# Patient Record
Sex: MALE | Race: WHITE | NOT HISPANIC OR LATINO
[De-identification: names, ages, dates, MRNs, and addresses within clinical notes are randomized per-mention and may not be internally consistent; named-entity substitution may affect disease eponyms.]

---
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
## 2020-01-10 ENCOUNTER — CHART COPY (OUTPATIENT)
Age: 20
End: 2020-01-10

## 2020-02-03 ENCOUNTER — FORM ENCOUNTER (OUTPATIENT)
Age: 20
End: 2020-02-03

## 2020-02-04 ENCOUNTER — APPOINTMENT (OUTPATIENT)
Dept: MRI IMAGING | Facility: CLINIC | Age: 20
End: 2020-02-04
Payer: COMMERCIAL

## 2020-02-04 ENCOUNTER — OUTPATIENT (OUTPATIENT)
Dept: OUTPATIENT SERVICES | Facility: HOSPITAL | Age: 20
LOS: 1 days | End: 2020-02-04
Payer: COMMERCIAL

## 2020-02-04 DIAGNOSIS — Z00.8 ENCOUNTER FOR OTHER GENERAL EXAMINATION: ICD-10-CM

## 2020-02-04 PROCEDURE — 73720 MRI LWR EXTREMITY W/O&W/DYE: CPT | Mod: 26,LT

## 2020-02-04 PROCEDURE — 73720 MRI LWR EXTREMITY W/O&W/DYE: CPT

## 2020-02-04 PROCEDURE — A9585: CPT

## 2020-02-04 PROCEDURE — C8914: CPT

## 2020-03-12 ENCOUNTER — APPOINTMENT (OUTPATIENT)
Dept: INTERVENTIONAL RADIOLOGY/VASCULAR | Facility: CLINIC | Age: 20
End: 2020-03-12
Payer: COMMERCIAL

## 2020-05-21 ENCOUNTER — APPOINTMENT (OUTPATIENT)
Dept: INTERVENTIONAL RADIOLOGY/VASCULAR | Facility: CLINIC | Age: 20
End: 2020-05-21
Payer: COMMERCIAL

## 2020-05-21 VITALS — HEIGHT: 66 IN | BODY MASS INDEX: 27.97 KG/M2 | WEIGHT: 174 LBS

## 2020-05-21 DIAGNOSIS — M79.605 PAIN IN LEFT LEG: ICD-10-CM

## 2020-05-21 PROCEDURE — 99215 OFFICE O/P EST HI 40 MIN: CPT | Mod: 95

## 2020-05-21 NOTE — CONSULT LETTER
[Dear  ___] : Dear  [unfilled], [Consult Letter:] : I had the pleasure of evaluating your patient, [unfilled]. [Please see my note below.] : Please see my note below. [Consult Closing:] : Thank you very much for allowing me to participate in the care of this patient.  If you have any questions, please do not hesitate to contact me. [Sincerely,] : Sincerely, [FreeTextEntry2] : Christiano Storey  [FreeTextEntry3] : Ac Josue MD, FSIR, FACR\par Interventional Radiologist\par  \par Executive \par Department of Radiology- [facility] \par  \par Associate Professor of Radiology\par [employer] [facility] \par

## 2020-05-21 NOTE — HISTORY OF PRESENT ILLNESS
[Home] : at home, [unfilled] , at the time of the visit. [Medical Office: ([facility])___] : at the medical office located in  [Mother] : mother [Verbal consent obtained from patient] : the patient, [unfilled] [FreeTextEntry1] : pt denies having any COVID-19 related symptoms or being near some that tested positive. Denies having any fever, chills, flu like symptoms or rash. Denies any history of out state travel in the last 2 weeks. \par Denies being tested for COVID-19 \par \par 20 years old male with hx of LLE pain which he states has been going on for the 2 years. Pain is mostly in the left calf. Pain gets worse with walking, prolonged standing. He denies trauma/injury to the site. He did state that he had a fracture at the age of [age] in the LLE. He rates his pain as  2/10 he is not taking any pain medications.\par He denies any numbness, tingling, in the LLE. \par \par He was seen by orthopedic and had an MRI done of the LLE. MRI demonstrated, “focal rounded area of hyperintense T2 weighted signal in the proximal medial gastrocnemius muscle measuring 1.5 cm potentially representing a hemangioma or focal dilated blood vessels, which appear non aggressive”.\par \par Interval history\par pt is s/p LLE sclerotherapy on 1/14/19 . pt did not follow up post procedure in the office but called the office in January and reported that his symptoms are back and he is experiencing LLE pain.pt was advised to have MRI/MRA done to further evaluated the vascular malformation. pt have the scan done on 2/24/20 that revealed 1.5 x 1.4 x 1.4 cm vascular lesion within the proximal medial gastrocnemius \par muscle as above. \par \par Due to COVID-19 crisis pt was not seen in the office post MRI/MRA scan.\par \par pt continues to have pain rates the pain as 5 /10 and reports taking and takes Advil \par reports the pain gets worst with activity and ambulation \par Mom reports pt is limping when he walks. \par \par Denies any recent SOB, CP, fever, chills, n/v/d. \par \par \par \par Patient is referred for consultation to discuss treatment plan for LLE vascular malformation. \par \par Denies any recent SOB, CP, fever, chills, n/v/d. \par

## 2020-05-21 NOTE — ASSESSMENT
[FreeTextEntry1] : Consult performed via [facility] with patient and mother.\par Reviewed recent MRI demonstrating vascular lesion within the gastrocnemius muscle in the posterior-lateral proximal lower leg. Again, the Legion is consistent with a venous malformation on MRI and clinically.\par I reviewed the prior procedure where we had some difficulty targeting the lesion and eventually utilize cone beam CT for lesion puncture. We did review the possible surgical alternative although at this point, I feel it would be reasonable to see if we could repeat the procedure and achieve a more durable results. If not, I would consider a surgical approach. We did discuss that these lesions are often not cured and that the decision-maker in terms of proceeding to surgery after attempt at sclerotherapy would be dependent upon the length of the duration of symptom relief.\par

## 2020-05-21 NOTE — REVIEW OF SYSTEMS
[Fever] : no fever [Chills] : no chills [Loss Of Hearing] : no hearing loss [Eyesight Problems] : no eyesight problems [Chest Pain] : no chest pain [Palpitations] : no palpitations [Shortness Of Breath] : no shortness of breath [Easy Bleeding] : no tendency for easy bleeding [Easy Bruising] : no tendency for easy bruising

## 2020-05-21 NOTE — DATA REVIEWED
[FreeTextEntry1] : MRI/MRA images reviewed and results discussed in great detail with the patient and mother.Small vascular lesion in the medial portion of the proximal gastrocnemius.

## 2020-06-20 DIAGNOSIS — Z01.818 ENCOUNTER FOR OTHER PREPROCEDURAL EXAMINATION: ICD-10-CM

## 2020-06-20 LAB
ANION GAP SERPL CALC-SCNC: 13 MMOL/L
APTT BLD: 33.6 SEC
BASOPHILS # BLD AUTO: 0.05 K/UL
BASOPHILS NFR BLD AUTO: 0.8 %
BUN SERPL-MCNC: 16 MG/DL
CALCIUM SERPL-MCNC: 10.2 MG/DL
CHLORIDE SERPL-SCNC: 101 MMOL/L
CO2 SERPL-SCNC: 27 MMOL/L
CREAT SERPL-MCNC: 0.82 MG/DL
EOSINOPHIL # BLD AUTO: 0.24 K/UL
EOSINOPHIL NFR BLD AUTO: 3.9 %
GLUCOSE SERPL-MCNC: 105 MG/DL
HCT VFR BLD CALC: 47.2 %
HGB BLD-MCNC: 15.1 G/DL
IMM GRANULOCYTES NFR BLD AUTO: 0.2 %
INR PPP: 1.03 RATIO
LYMPHOCYTES # BLD AUTO: 2 K/UL
LYMPHOCYTES NFR BLD AUTO: 32.7 %
MAN DIFF?: NORMAL
MCHC RBC-ENTMCNC: 28.4 PG
MCHC RBC-ENTMCNC: 32 GM/DL
MCV RBC AUTO: 88.9 FL
MONOCYTES # BLD AUTO: 0.59 K/UL
MONOCYTES NFR BLD AUTO: 9.6 %
NEUTROPHILS # BLD AUTO: 3.23 K/UL
NEUTROPHILS NFR BLD AUTO: 52.8 %
PLATELET # BLD AUTO: 303 K/UL
POTASSIUM SERPL-SCNC: 4.2 MMOL/L
PT BLD: 11.8 SEC
RBC # BLD: 5.31 M/UL
RBC # FLD: 12.2 %
SODIUM SERPL-SCNC: 141 MMOL/L
WBC # FLD AUTO: 6.12 K/UL

## 2020-06-22 ENCOUNTER — APPOINTMENT (OUTPATIENT)
Dept: DISASTER EMERGENCY | Facility: CLINIC | Age: 20
End: 2020-06-22

## 2020-06-23 LAB — SARS-COV-2 N GENE NPH QL NAA+PROBE: NOT DETECTED

## 2020-06-24 ENCOUNTER — OUTPATIENT (OUTPATIENT)
Dept: OUTPATIENT SERVICES | Age: 20
LOS: 1 days | End: 2020-06-24
Payer: COMMERCIAL

## 2020-06-24 DIAGNOSIS — Q27.8 OTHER SPECIFIED CONGENITAL MALFORMATIONS OF PERIPHERAL VASCULAR SYSTEM: ICD-10-CM

## 2020-06-24 PROCEDURE — 37241 VASC EMBOLIZE/OCCLUDE VENOUS: CPT

## 2020-07-06 DIAGNOSIS — Q27.9 CONGENITAL MALFORMATION OF PERIPHERAL VASCULAR SYSTEM, UNSPECIFIED: ICD-10-CM

## 2021-03-05 ENCOUNTER — APPOINTMENT (OUTPATIENT)
Dept: MRI IMAGING | Facility: CLINIC | Age: 21
End: 2021-03-05
Payer: COMMERCIAL

## 2021-03-05 ENCOUNTER — RESULT REVIEW (OUTPATIENT)
Age: 21
End: 2021-03-05

## 2021-03-05 ENCOUNTER — OUTPATIENT (OUTPATIENT)
Dept: OUTPATIENT SERVICES | Facility: HOSPITAL | Age: 21
LOS: 1 days | End: 2021-03-05
Payer: COMMERCIAL

## 2021-03-05 DIAGNOSIS — Z00.8 ENCOUNTER FOR OTHER GENERAL EXAMINATION: ICD-10-CM

## 2021-03-05 PROCEDURE — 73720 MRI LWR EXTREMITY W/O&W/DYE: CPT | Mod: 26,LT

## 2021-03-05 PROCEDURE — A9585: CPT

## 2021-03-05 PROCEDURE — C8914: CPT

## 2021-03-05 PROCEDURE — 73725 MR ANG LWR EXT W OR W/O DYE: CPT | Mod: 26,59,LT

## 2021-03-05 PROCEDURE — 73720 MRI LWR EXTREMITY W/O&W/DYE: CPT

## 2021-04-08 ENCOUNTER — APPOINTMENT (OUTPATIENT)
Dept: INTERVENTIONAL RADIOLOGY/VASCULAR | Facility: CLINIC | Age: 21
End: 2021-04-08
Payer: COMMERCIAL

## 2021-04-08 VITALS
HEART RATE: 98 BPM | SYSTOLIC BLOOD PRESSURE: 104 MMHG | RESPIRATION RATE: 18 BRPM | DIASTOLIC BLOOD PRESSURE: 69 MMHG | WEIGHT: 180 LBS | OXYGEN SATURATION: 96 % | BODY MASS INDEX: 28.93 KG/M2 | HEIGHT: 66 IN

## 2021-04-08 PROCEDURE — XXXXX: CPT

## 2021-04-08 RX ORDER — ASCORBIC ACID 500 MG
TABLET ORAL
Refills: 0 | Status: COMPLETED | COMMUNITY
End: 2021-04-08

## 2021-04-08 RX ORDER — CHROMIUM 200 MCG
TABLET ORAL
Refills: 0 | Status: COMPLETED | COMMUNITY
End: 2021-04-08

## 2021-05-25 ENCOUNTER — APPOINTMENT (OUTPATIENT)
Dept: PLASTIC SURGERY | Facility: CLINIC | Age: 21
End: 2021-05-25
Payer: COMMERCIAL

## 2021-05-25 VITALS — HEIGHT: 66 IN | WEIGHT: 184 LBS | BODY MASS INDEX: 29.57 KG/M2

## 2021-05-25 PROCEDURE — 99203 OFFICE O/P NEW LOW 30 MIN: CPT

## 2021-05-26 NOTE — ASSESSMENT
[FreeTextEntry1] : Pt was seen and examined together by KIAN Gamboa and Dr. Fareed Bell. Assessment and plan formulated and discussed at time of visit.\par

## 2021-08-11 ENCOUNTER — OUTPATIENT (OUTPATIENT)
Dept: OUTPATIENT SERVICES | Facility: HOSPITAL | Age: 21
LOS: 1 days | End: 2021-08-11

## 2021-08-11 VITALS
WEIGHT: 190.04 LBS | HEIGHT: 66.5 IN | HEART RATE: 97 BPM | TEMPERATURE: 98 F | SYSTOLIC BLOOD PRESSURE: 131 MMHG | OXYGEN SATURATION: 98 % | DIASTOLIC BLOOD PRESSURE: 83 MMHG | RESPIRATION RATE: 16 BRPM

## 2021-08-11 DIAGNOSIS — L60.0 INGROWING NAIL: ICD-10-CM

## 2021-08-11 DIAGNOSIS — Q27.8 OTHER SPECIFIED CONGENITAL MALFORMATIONS OF PERIPHERAL VASCULAR SYSTEM: ICD-10-CM

## 2021-08-11 DIAGNOSIS — Z98.890 OTHER SPECIFIED POSTPROCEDURAL STATES: Chronic | ICD-10-CM

## 2021-08-11 DIAGNOSIS — L60.0 INGROWING NAIL: Chronic | ICD-10-CM

## 2021-08-11 LAB
ANION GAP SERPL CALC-SCNC: 15 MMOL/L — HIGH (ref 7–14)
BUN SERPL-MCNC: 15 MG/DL — SIGNIFICANT CHANGE UP (ref 7–23)
CALCIUM SERPL-MCNC: 10.2 MG/DL — SIGNIFICANT CHANGE UP (ref 8.4–10.5)
CHLORIDE SERPL-SCNC: 103 MMOL/L — SIGNIFICANT CHANGE UP (ref 98–107)
CO2 SERPL-SCNC: 24 MMOL/L — SIGNIFICANT CHANGE UP (ref 22–31)
CREAT SERPL-MCNC: 0.89 MG/DL — SIGNIFICANT CHANGE UP (ref 0.5–1.3)
GLUCOSE SERPL-MCNC: 95 MG/DL — SIGNIFICANT CHANGE UP (ref 70–99)
HCT VFR BLD CALC: 45.1 % — SIGNIFICANT CHANGE UP (ref 39–50)
HGB BLD-MCNC: 15 G/DL — SIGNIFICANT CHANGE UP (ref 13–17)
MCHC RBC-ENTMCNC: 28.8 PG — SIGNIFICANT CHANGE UP (ref 27–34)
MCHC RBC-ENTMCNC: 33.3 GM/DL — SIGNIFICANT CHANGE UP (ref 32–36)
MCV RBC AUTO: 86.7 FL — SIGNIFICANT CHANGE UP (ref 80–100)
NRBC # BLD: 0 /100 WBCS — SIGNIFICANT CHANGE UP
NRBC # FLD: 0 K/UL — SIGNIFICANT CHANGE UP
PLATELET # BLD AUTO: 322 K/UL — SIGNIFICANT CHANGE UP (ref 150–400)
POTASSIUM SERPL-MCNC: 3.7 MMOL/L — SIGNIFICANT CHANGE UP (ref 3.5–5.3)
POTASSIUM SERPL-SCNC: 3.7 MMOL/L — SIGNIFICANT CHANGE UP (ref 3.5–5.3)
RBC # BLD: 5.2 M/UL — SIGNIFICANT CHANGE UP (ref 4.2–5.8)
RBC # FLD: 12 % — SIGNIFICANT CHANGE UP (ref 10.3–14.5)
SODIUM SERPL-SCNC: 142 MMOL/L — SIGNIFICANT CHANGE UP (ref 135–145)
WBC # BLD: 7.53 K/UL — SIGNIFICANT CHANGE UP (ref 3.8–10.5)
WBC # FLD AUTO: 7.53 K/UL — SIGNIFICANT CHANGE UP (ref 3.8–10.5)

## 2021-08-11 NOTE — H&P PST ADULT - NSICDXPASTMEDICALHX_GEN_ALL_CORE_FT
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

## 2021-08-11 NOTE — H&P PST ADULT - NSICDXPASTSURGICALHX_GEN_ALL_CORE_FT
PAST SURGICAL HISTORY:  Ingrown toenail s/p surgery one week ago- still on antibiotics,  'healing well'    S/P sclerotherapy of varicose veins LLE  2019, 2020

## 2021-08-11 NOTE — H&P PST ADULT - NSICDXFAMILYHX_GEN_ALL_CORE_FT
FAMILY HISTORY:  Father  Still living? Yes, Estimated age: Age Unknown  FH: brain tumor, Age at diagnosis: Age Unknown    Sibling  Still living? Yes, Estimated age: 11-20  Family history of hemophilia A, Age at diagnosis: Age Unknown

## 2021-08-11 NOTE — H&P PST ADULT - WEIGHT IN KG
Patient: aCrlos Zazueta    Procedure Summary     Date:  11/15/18 Room / Location:  [facility] OR 08 / [facility] OR    Anesthesia Start:  0845 Anesthesia Stop:  1133    Procedures:       FIRST METATARSALPHALANGEAL JOINT  ARTHRRODOSIS (popliteal block) (Right )      AND SECOND PLANTAR PLATE REPIAR AND ALL OTHER INDICATED PROCEDURES      (C-ARM) (Right Foot) Diagnosis:       Hallux rigidus of right foot      Injury of plantar plate of right foot, subsequent encounter      (Hallux rigidus of right foot [M20.21])      (Injury of plantar plate of right foot, subsequent encounter [S99.921D])    Surgeon:  Anthony Moore DPM Provider:  David Herrera MD    Anesthesia Type:  general, regional ASA Status:  3          Anesthesia Type: general, regional  Last vitals  BP   128/68 (11/15/18 1123)   Temp   99.3 °F (37.4 °C) (11/15/18 1123)   Pulse   78 (11/15/18 1123)   Resp   18 (11/15/18 1123)     SpO2   96 % (11/15/18 1123)     Post Anesthesia Care and Evaluation    Patient location during evaluation: PACU  Patient participation: complete - patient participated  Level of consciousness: awake and alert  Pain score: 0  Pain management: adequate  Airway patency: patent  Anesthetic complications: No anesthetic complications  PONV Status: none  Cardiovascular status: acceptable  Respiratory status: acceptable  Hydration status: acceptable      
86.2

## 2021-08-11 NOTE — H&P PST ADULT - NSANTHOSAYNRD_GEN_A_CORE
No. JILLIAN screening performed.  STOP BANG Legend: 0-2 = LOW Risk; 3-4 = INTERMEDIATE Risk; 5-8 = HIGH Risk

## 2021-08-11 NOTE — H&P PST ADULT - HEALTH CARE MAINTENANCE
covid vaccine : pfizer February and March 2021  Denies history of covid infection, or exposure to known +covid person   Traveled to [facility] returned July 30, 2021 covid vaccine : pfizer February and March 2021  Denies history of covid infection, or exposure to known +covid person   Traveled to [facility] returned July 30, 2021, covid test negative per pt

## 2021-08-11 NOTE — H&P PST ADULT - HISTORY OF PRESENT ILLNESS
21 yr old male presents with preop dx other specified congenital malformation peripheral vascular system, scheduled for excision of posterior leg mass, left complex closure, patient reports chronic left posterior knee pain, MRI noted venous malformation s/p  sclerotherapy with mild improvement of knee pain, now for surgery, patient reports pain sometimes is subtle while some days pain is more intense and limiting activities , denies numbness or tingling   of LLE  21 yr old male presents with preop dx other specified congenital malformation peripheral vascular system, scheduled for excision of posterior leg mass, left complex closure, patient reports chronic left posterior knee pain, MRI noted venous malformation s/p  sclerotherapy with mild improvement of knee pain, now for surgery, patient reports pain sometimes is subtle while some days pain is more intense and limiting activities , denies numbness or tingling of LLE

## 2021-08-15 ENCOUNTER — APPOINTMENT (OUTPATIENT)
Dept: DISASTER EMERGENCY | Facility: CLINIC | Age: 21
End: 2021-08-15

## 2021-08-16 ENCOUNTER — APPOINTMENT (OUTPATIENT)
Dept: DISASTER EMERGENCY | Facility: CLINIC | Age: 21
End: 2021-08-16

## 2021-08-16 DIAGNOSIS — Z01.818 ENCOUNTER FOR OTHER PREPROCEDURAL EXAMINATION: ICD-10-CM

## 2021-08-17 ENCOUNTER — TRANSCRIPTION ENCOUNTER (OUTPATIENT)
Age: 21
End: 2021-08-17

## 2021-08-17 VITALS
WEIGHT: 190.04 LBS | TEMPERATURE: 98 F | RESPIRATION RATE: 16 BRPM | HEART RATE: 98 BPM | SYSTOLIC BLOOD PRESSURE: 123 MMHG | OXYGEN SATURATION: 98 % | HEIGHT: 66.5 IN | DIASTOLIC BLOOD PRESSURE: 66 MMHG

## 2021-08-18 ENCOUNTER — APPOINTMENT (OUTPATIENT)
Dept: PLASTIC SURGERY | Facility: HOSPITAL | Age: 21
End: 2021-08-18
Payer: COMMERCIAL

## 2021-08-18 ENCOUNTER — RESULT REVIEW (OUTPATIENT)
Age: 21
End: 2021-08-18

## 2021-08-18 ENCOUNTER — OUTPATIENT (OUTPATIENT)
Dept: OUTPATIENT SERVICES | Facility: HOSPITAL | Age: 21
LOS: 1 days | Discharge: HOME | End: 2021-08-18
Payer: COMMERCIAL

## 2021-08-18 VITALS
DIASTOLIC BLOOD PRESSURE: 66 MMHG | SYSTOLIC BLOOD PRESSURE: 107 MMHG | TEMPERATURE: 98 F | RESPIRATION RATE: 16 BRPM | HEART RATE: 74 BPM | OXYGEN SATURATION: 98 %

## 2021-08-18 DIAGNOSIS — L60.0 INGROWING NAIL: Chronic | ICD-10-CM

## 2021-08-18 DIAGNOSIS — Z98.890 OTHER SPECIFIED POSTPROCEDURAL STATES: Chronic | ICD-10-CM

## 2021-08-18 DIAGNOSIS — Q27.8 OTHER SPECIFIED CONGENITAL MALFORMATIONS OF PERIPHERAL VASCULAR SYSTEM: ICD-10-CM

## 2021-08-18 PROCEDURE — 27615 RESECT LEG/ANKLE TUM < 5 CM: CPT | Mod: LT

## 2021-08-18 PROCEDURE — 88305 TISSUE EXAM BY PATHOLOGIST: CPT | Mod: 26

## 2021-08-18 PROCEDURE — 13121 CMPLX RPR S/A/L 2.6-7.5 CM: CPT | Mod: 59

## 2021-08-18 RX ORDER — IBUPROFEN 200 MG
1 TABLET ORAL
Qty: 0 | Refills: 0 | DISCHARGE

## 2021-08-18 RX ORDER — CEPHALEXIN 500 MG
1 CAPSULE ORAL
Qty: 0 | Refills: 0 | DISCHARGE

## 2021-08-18 RX ORDER — OXYCODONE HYDROCHLORIDE 5 MG/1
1 TABLET ORAL
Qty: 10 | Refills: 0
Start: 2021-08-18

## 2021-08-18 NOTE — BRIEF OPERATIVE NOTE - NSICDXBRIEFPREOP_GEN_ALL_CORE_FT
PRE-OP DIAGNOSIS:  Other congenital anomaly of peripheral vascular system 18-Aug-2021 17:11:56  Margaret Valerio

## 2021-08-18 NOTE — ASU DISCHARGE PLAN (ADULT/PEDIATRIC) - CARE PROVIDER_API CALL
Fareed Bell)  Plastic Surgery  [address], [place] [address]  Phone: [phone]  Fax: [phone]  Follow Up Time: 1 week

## 2021-08-18 NOTE — BRIEF OPERATIVE NOTE - NSICDXBRIEFPOSTOP_GEN_ALL_CORE_FT
POST-OP DIAGNOSIS:  Other congenital anomaly of peripheral vascular system 18-Aug-2021 17:12:00  Margaret Valerio

## 2021-08-18 NOTE — ASU DISCHARGE PLAN (ADULT/PEDIATRIC) - ASU DC SPECIAL INSTRUCTIONSFT
Please follow all pre-operative instructions by Dr Bell.      Please keep the surgical dressing in place for 48 hours.  After 48 hours you may remove the ACE wrap and abdominal pad.  Under the abdominal pad you will notice a special type of band aid called steri strips. Please do not remove these, they will either be removed in the office or fall off on their own.  Please note that it is normal to have some oozing from the surgical site on to the dressing.  After 48 hours you may shower.  When showering please do not scrub or rub directly over the incisions and pat to dry.      For pain control please take Tylenol  and ibuprofen every 6 hours throughout the day.  Additionally you have been prescribed a narcotic medication which you can take for severe pain. Please follow all pre-operative instructions by Dr Bell.      Please keep the surgical dressing in place for 48 hours.  After 48 hours you may remove the ACE wrap and abdominal pad.  Under the abdominal pad you will notice a special type of band aid called steri strips. Please do not remove these, they will either be removed in the office or fall off on their own.  Please note that it is normal to have some oozing from the surgical site on to the dressing.  After 48 hours you may shower.  When showering please do not scrub or rub directly over the incisions and pat to dry.  After the shower you may leave the surgical dressing off.  If you want you may cover the incisions with guaze and the ACE wrap for comfort.     For pain control please take Tylenol  and ibuprofen every 6 hours throughout the day.  Additionally you have been prescribed a narcotic medication which you can take for severe pain.

## 2021-08-20 PROBLEM — F84.0 AUTISTIC DISORDER: Chronic | Status: ACTIVE | Noted: 2021-08-11

## 2021-08-20 PROBLEM — Q27.8 OTHER SPECIFIED CONGENITAL MALFORMATIONS OF PERIPHERAL VASCULAR SYSTEM: Chronic | Status: ACTIVE | Noted: 2021-08-11

## 2021-08-20 PROBLEM — L60.0 INGROWING NAIL: Chronic | Status: ACTIVE | Noted: 2021-08-11

## 2021-08-24 LAB — SURGICAL PATHOLOGY STUDY: SIGNIFICANT CHANGE UP

## 2021-08-26 ENCOUNTER — APPOINTMENT (OUTPATIENT)
Dept: PLASTIC SURGERY | Facility: CLINIC | Age: 21
End: 2021-08-26
Payer: COMMERCIAL

## 2021-08-26 PROCEDURE — 99024 POSTOP FOLLOW-UP VISIT: CPT

## 2021-08-27 NOTE — HISTORY OF PRESENT ILLNESS
[FreeTextEntry1] : DOP 08/24/21\par Excisional biopsy mass of left leg\par Biopsy: venus vascular malformation

## 2021-09-09 ENCOUNTER — APPOINTMENT (OUTPATIENT)
Dept: PLASTIC SURGERY | Facility: CLINIC | Age: 21
End: 2021-09-09
Payer: COMMERCIAL

## 2021-09-09 DIAGNOSIS — Q27.8 OTHER SPECIFIED CONGENITAL MALFORMATIONS OF PERIPHERAL VASCULAR SYSTEM: ICD-10-CM

## 2021-09-09 PROCEDURE — 99024 POSTOP FOLLOW-UP VISIT: CPT

## 2021-09-09 NOTE — HISTORY OF PRESENT ILLNESS
[FreeTextEntry1] : DOP 08/24/21\par Excisional biopsy mass of left leg\par Biopsy: venus vascular malformation\par  No excessive bleeding. No fevers. No odor. No purulent discharge. No excessive pain.\par pain imrpoving

## 2021-10-21 ENCOUNTER — APPOINTMENT (OUTPATIENT)
Dept: PLASTIC SURGERY | Facility: CLINIC | Age: 21
End: 2021-10-21

## 2021-12-09 NOTE — H&P PST ADULT - VTE RISK COMMENTS
30 days only sent. Due for apt. Will need to sign med agreement if she is going to continue on this med since its considered controlled. family (brother- younger) with hx of hemophilia A, per pt his entire family tested- all members negative
